# Patient Record
(demographics unavailable — no encounter records)

---

## 2025-02-04 NOTE — CONSULT LETTER
[Consult Letter:] : I had the pleasure of evaluating your patient, [unfilled]. [Please see my note below.] : Please see my note below. [Consult Closing:] : Thank you very much for allowing me to participate in the care of this patient.  If you have any questions, please do not hesitate to contact me. [Sincerely,] : Sincerely, [DrSabino  ___] : Dr. RUSH [Dear  ___] : Dear  [unfilled], [FreeTextEntry3] : Bubba Bueno MD Co-Director [facility]

## 2025-02-04 NOTE — PHYSICAL EXAM
[de-identified] : On exam tender over the base of the distal phalanx.  No subungual hematoma.  Nail plate underneath fold.  FPL and EPL intact.  Mild swelling [de-identified] : PA lateral oblique x-rays from [facility] demonstrate a Salter-Willoughby II base of distal phalanx fracture.  MRI reviewed demonstrating a distal phalanx Salter-Willoughby II fracture  PA lateral taken today to confirm a Salter-Willoughby II fracture

## 2025-02-04 NOTE — ASSESSMENT
[FreeTextEntry1] : Patient has a Salter-Willoughby II fracture base of distal phalanx.  Recommend full-time extension splint.  To mallet type splint will be applied.  Recommend no sports for 3 weeks

## 2025-02-04 NOTE — HISTORY OF PRESENT ILLNESS
[Right] : right hand dominant [FreeTextEntry1] : Date of injury: 1/27/2025 (1 week status post injury)  Patient presents today with his mother for evaluation of left thumb injury sustained in a gym class.  Patient was seen at the urgent care where he got x-rays which demonstrated a distal phalangeal fracture of the thumb patient was provided with a finger splint. and has been wearing it consistently but takes it for hygiene purposes. Patient was also seen by an orthopedic surgeon who ordered getting an MRI of the thumb.   MRI of the left thumb demonstrated bone marrow edema associated with first distal phalanx fracture corresponding to description of similar finding on routine reported from previous outside x-ray dated 1/27/2025. Dorsal soft tissue edema identified at the first distal interphalangeal joint.  Possibility of partial tear associated with the extensor pollicis longus tendon at the dorsal aspect of the first distal interphalangeal joint will be considered.

## 2025-02-24 NOTE — PHYSICAL EXAM
[de-identified] : Nontender at distal phalanx.  Nail underneath eponychial fold full extension and flexion to 65 can touch base of distal palmar crease below small finger [de-identified] : PA lateral oblique x-rays demonstrate a healed fracture with an open growth plate

## 2025-02-24 NOTE — HISTORY OF PRESENT ILLNESS
[Right] : right hand dominant [FreeTextEntry1] : Date of injury: 1/27/2025  4 weeks status post left thumb distal phalanx fracture Salter-Willoughby II. No new injury. Patient has been wearing splint.

## 2025-02-24 NOTE — ASSESSMENT
[FreeTextEntry1] : Patient can discontinue the splint.  Would avoid contact sports for at least another week.

## 2025-02-24 NOTE — PHYSICAL EXAM
[de-identified] : Nontender at distal phalanx.  Nail underneath eponychial fold full extension and flexion to 65 can touch base of distal palmar crease below small finger [de-identified] : PA lateral oblique x-rays demonstrate a healed fracture with an open growth plate